# Patient Record
Sex: FEMALE | Race: ASIAN | NOT HISPANIC OR LATINO
[De-identification: names, ages, dates, MRNs, and addresses within clinical notes are randomized per-mention and may not be internally consistent; named-entity substitution may affect disease eponyms.]

---

## 2020-05-04 ENCOUNTER — TRANSCRIPTION ENCOUNTER (OUTPATIENT)
Age: 55
End: 2020-05-04

## 2020-09-16 PROBLEM — Z00.00 ENCOUNTER FOR PREVENTIVE HEALTH EXAMINATION: Status: ACTIVE | Noted: 2020-09-16

## 2020-10-09 ENCOUNTER — APPOINTMENT (OUTPATIENT)
Dept: BREAST CENTER | Facility: CLINIC | Age: 55
End: 2020-10-09
Payer: MEDICAID

## 2020-10-09 VITALS
DIASTOLIC BLOOD PRESSURE: 72 MMHG | WEIGHT: 105 LBS | HEIGHT: 63 IN | SYSTOLIC BLOOD PRESSURE: 108 MMHG | OXYGEN SATURATION: 99 % | BODY MASS INDEX: 18.61 KG/M2 | TEMPERATURE: 96.9 F | HEART RATE: 74 BPM

## 2020-10-09 DIAGNOSIS — Z85.3 PERSONAL HISTORY OF MALIGNANT NEOPLASM OF BREAST: ICD-10-CM

## 2020-10-09 DIAGNOSIS — M25.50 PAIN IN UNSPECIFIED JOINT: ICD-10-CM

## 2020-10-09 DIAGNOSIS — Z78.9 OTHER SPECIFIED HEALTH STATUS: ICD-10-CM

## 2020-10-09 DIAGNOSIS — N63.10 UNSPECIFIED LUMP IN THE RIGHT BREAST, UNSPECIFIED QUADRANT: ICD-10-CM

## 2020-10-09 PROCEDURE — 99203 OFFICE O/P NEW LOW 30 MIN: CPT

## 2020-10-09 NOTE — PHYSICAL EXAM
[Normocephalic] : normocephalic [Atraumatic] : atraumatic [Supple] : supple [No Supraclavicular Adenopathy] : no supraclavicular adenopathy [Examined in the supine and seated position] : examined in the supine and seated position [Bra Size: ___] : Bra Size: [unfilled] [No dominant masses] : no dominant masses in right breast  [No dominant masses] : no dominant masses left breast [No Nipple Retraction] : no left nipple retraction [No Nipple Discharge] : no left nipple discharge [No Axillary Lymphadenopathy] : no left axillary lymphadenopathy [No Edema] : no edema [No Swelling] : no swelling [Full ROM] : full range of motion [No Rashes] : no rashes [No Ulceration] : no ulceration [de-identified] : LOQ and PA well healed scars  [de-identified] : small nodular area at axillary tail (pt states has been there for 6 years)

## 2020-10-09 NOTE — ASSESSMENT
[FreeTextEntry1] : Pt is a 56 y/o female, here for consultation visit, referred by Justo Tomas. Hx Right breast cancer [id number]) ER+ [place]+ [id number].Oncotype 17, s/p right lumpectomy, SLN bx by me in 2013 with EBRT at [facility].  Was on exemestane.  Doing well but here for f.u 7 years out. \par Pt denies any breast lesions, discharge or masses except small nodule at axillary tail which she has had since surgery. Does not think changed. \par No FHx of any cancer.   \par \par 8/17/20 [facility] DmmgT/US: priors to 2016, dense, R post Tx changes, multiple surgical clips, no sig interval change.\par 8/17/20 [facility]ada US: priors to 2016, \par    R  12:00 N2 (0.6x0.3x0.1cm) stable hypoechoic lesion,\par        7:00 scar\par       10:00 N8 (0.4x0.9x0.5cm) heterogeneous lesion decreased,\par        RA scar site,\par        axilla scar site, surgical changes,\par        no susp findings;\par   L  2:00 N3 (0.3 0.1x0.4cm) stable nodule,\par       5:00 N3 ( 0.6x0.5x0.2cm) stable nodule. BR2D. Routine screening rec.\par \par CBE: Right well healed LOQ and PA scars.  Small nodular area at axillary tail. No lymphadenopathy.\par Reviewed recent studies and CBE . CBE with nodular area could be decreased heterogeneous lesion noted on 8/20 u/s, but rec targeted u/s to the palpable area. ? scar tissue/

## 2020-10-09 NOTE — DATA REVIEWED
[FreeTextEntry1] : 8/22/13 [facility] R Bx: benign, microcalcs.\par 2013 Right T1aN0 breast cancer\par \par 8/17/20 [facility] Estrada DmmgT/US: priors to 2016, dense, R post Tx changes, multiple surgical clips, no sig interval change.\par \par \par 8/17/20 [facility] US: priors to 2016, \par    R  12:00 N2 (0.6x0.3x0.1cm) stable hypoechoic lesion,\par        7:00 scar\par       10:00 N8 (0.4x0.9x0.5cm) heterogeneous lesion decreased,\par        RA scar site,\par        axil;la scar site, surgical changes,\par        no susp findings;\par   L  2:00 N3 (0.3 0.1x0.4cm) stable nodule,\par       5:00 N3 ( 0.6x0.5x0.2cm) stable nodule. BR2D. Routine screening rec.\par       \par \par

## 2020-10-09 NOTE — PAST MEDICAL HISTORY
[Postmenopausal] : The patient is postmenopausal [Menopause Age____] : age at menopause was [unfilled] [Total Preg ___] : G[unfilled] [Living ___] : Living: [unfilled] [Age At Live Birth ___] : Age at live birth: [unfilled] [FreeTextEntry6] : no [FreeTextEntry7] : no [FreeTextEntry8] : 1/4 year

## 2020-10-09 NOTE — HISTORY OF PRESENT ILLNESS
[FreeTextEntry1] : Pt is a 56 y/o female, here for consultation visit, referred by Justo Tomas. Hx Right breast cancer [id number]) ER+ [place]+ [id number].Oncotype 17, s/p right lumpectomy, SLN bx by me in 2013 with EBRT at [facility].  Was on exemestane.  Doing well but here for f.u 7 years out. \par Pt denies any breast lesions, discharge or masses except small nodule at axillary tail which she has had since surgery. Does not think changed. \par No FHx of any cancer.   \par \par 8/17/20 [facility] Estrada DmmgT/US: priors to 2016, dense, R post Tx changes, multiple surgical clips, no sig interval change.\par 8/17/20 [facility] E[facility]ada US: priors to 2016, \par    R  12:00 N2 (0.6x0.3x0.1cm) stable hypoechoic lesion,\par        7:00 scar\par       10:00 N8 (0.4x0.9x0.5cm) heterogeneous lesion decreased,\par        RA scar site,\par        axil;la scar site, surgical changes,\par        no susp findings;\par   L  2:00 N3 (0.3 0.1x0.4cm) stable nodule,\par       5:00 N3 ( 0.6x0.5x0.2cm) stable nodule. BR2D. Routine screening rec.\par \par

## 2020-10-30 ENCOUNTER — NON-APPOINTMENT (OUTPATIENT)
Age: 55
End: 2020-10-30

## 2021-03-18 ENCOUNTER — APPOINTMENT (OUTPATIENT)
Dept: PHYSICAL MEDICINE AND REHAB | Facility: CLINIC | Age: 56
End: 2021-03-18

## 2024-04-30 ENCOUNTER — APPOINTMENT (OUTPATIENT)
Dept: NEUROSURGERY | Facility: CLINIC | Age: 59
End: 2024-04-30
Payer: MEDICAID

## 2024-04-30 VITALS
DIASTOLIC BLOOD PRESSURE: 83 MMHG | HEIGHT: 63 IN | HEART RATE: 83 BPM | WEIGHT: 103 LBS | SYSTOLIC BLOOD PRESSURE: 135 MMHG | TEMPERATURE: 98.2 F | BODY MASS INDEX: 18.25 KG/M2 | OXYGEN SATURATION: 99 %

## 2024-04-30 PROCEDURE — 99203 OFFICE O/P NEW LOW 30 MIN: CPT

## 2024-04-30 NOTE — PHYSICAL EXAM
[General Appearance - Alert] : alert [General Appearance - In No Acute Distress] : in no acute distress [Oriented To Time, Place, And Person] : oriented to person, place, and time [Cranial Nerves Optic (II)] : visual acuity intact bilaterally,  pupils equal round and reactive to light [Cranial Nerves Oculomotor (III)] : extraocular motion intact [Cranial Nerves Facial (VII)] : face symmetrical [Cranial Nerves Vestibulocochlear (VIII)] : hearing was intact bilaterally [Cranial Nerves Accessory (XI - Cranial And Spinal)] : head turning and shoulder shrug symmetric [Cranial Nerves Hypoglossal (XII)] : there was no tongue deviation with protrusion [Motor Tone] : muscle tone was normal in all four extremities [Motor Strength] : muscle strength was normal in all four extremities [Involuntary Movements] : no involuntary movements were seen [Sensation Tactile Decrease] : light touch was intact [Balance] : balance was intact [2+] : Ankle jerk left 2+ [No Visual Abnormalities] : no visible abnormailities [Able to toe walk] : the patient was able to toe walk [Able to heel walk] : the patient was able to heel walk [Sclera] : the sclera and conjunctiva were normal [PERRL With Normal Accommodation] : pupils were equal in size, round, reactive to light, with normal accommodation [Extraocular Movements] : extraocular movements were intact [Outer Ear] : the ears and nose were normal in appearance [Hearing Threshold Finger Rub Not [place]] : hearing was normal [Neck Appearance] : the appearance of the neck was normal [] : no respiratory distress [Exaggerated Use Of Accessory Muscles For Inspiration] : no accessory muscle use [Abnormal Walk] : normal gait [Skin Color & Pigmentation] : normal skin color and pigmentation [Parsons] : Parsons's sign was not demonstrated [Straight-Leg Raise Test - Left] : straight leg raise of the left leg was negative [Straight-Leg Raise Test - Right] : straight leg raise  of the right leg was negative

## 2024-04-30 NOTE — ASSESSMENT
[FreeTextEntry1] : 57 y/o F with history of breast cancer s/p right lumpectomy, history of left hip pain, degenerative joint disease/labral tear, here for neurosurgical evaluation of back pain. Pain for the past 5 years worse since last year. She had multiple epidural steroid injections last one on 3/17/24 with some improvement in pain and then recurrence. She is currently starting PT. Pain radiates to bilateral LEs worse on the left side and is associated with numbness and tingling. No saddle anesthesia, weakness, bladder or bowel issues. She takes Gabapentin and Naproxen for pain. MRI 3/17/23 showing grade 1 anterolisthesis of L3-4 and L4-5. L2-3 through L4-5 disc bulges, central and foraminal stenosis, worse at L4-5 level.  - Will obtain updated MRI lumbar spine since the last one was a year ago to r/o worsening stenosis  - X-ray flexion/extension - continue with PT and follow up after MRI and x-ray

## 2024-04-30 NOTE — HISTORY OF PRESENT ILLNESS
[FreeTextEntry1] : back pain  [de-identified] : 58-year-old female with history of breast cancer s/p right lumpectomy, history of left hip pain, degenerative joint disease/labral tear, here for neurosurgical evaluation of back pain. Pain for the past 5 years worse since last year. She had multiple epidural steroid injections last one on 3/17/24 with some improvement in pain and then recurrence. She is currently starting PT. Pain radiates to bilateral LEs worse on the left side and is associated with numbness and tingling. No saddle anesthesia, weakness, bladder or bowel issues. She takes Gabapentin and Naproxen for pain. MRI 3/17/23 showing grade 1 anterolisthesis of L3-4 and L4-5. L2-3 through L4-5 disc bulges, central and foraminal stenosis, worse at L4-5 level.

## 2024-05-06 ENCOUNTER — APPOINTMENT (OUTPATIENT)
Dept: RADIOLOGY | Facility: CLINIC | Age: 59
End: 2024-05-06
Payer: MEDICAID

## 2024-05-06 ENCOUNTER — APPOINTMENT (OUTPATIENT)
Dept: MRI IMAGING | Facility: CLINIC | Age: 59
End: 2024-05-06
Payer: MEDICAID

## 2024-05-06 PROCEDURE — 72110 X-RAY EXAM L-2 SPINE 4/>VWS: CPT

## 2024-05-06 PROCEDURE — 72148 MRI LUMBAR SPINE W/O DYE: CPT

## 2024-06-12 PROBLEM — M51.26 HERNIATION OF LUMBAR INTERVERTEBRAL DISC: Status: ACTIVE | Noted: 2024-04-29

## 2024-06-12 PROBLEM — M48.061 NEUROFORAMINAL STENOSIS OF LUMBAR SPINE: Status: ACTIVE | Noted: 2024-04-29

## 2024-06-12 PROBLEM — M48.061 SPINAL STENOSIS OF LUMBAR REGION AT MULTIPLE LEVELS: Status: ACTIVE | Noted: 2024-04-29

## 2024-06-12 NOTE — ASSESSMENT
[FreeTextEntry1] : 57 y/o F, with lower back pain radiating to bilateral LEs worse on the left side and is associated with numbness and tingling. MRI 3/17/23 showing grade 1 anterolisthesis of L3-4 and L4-5. L2-3 through L4-5 disc bulges, central and foraminal stenosis, worse at L4-5 level. She was ordered for updated MRI and x-ray flexion/extension.  ****INCOMPLETE****

## 2024-06-12 NOTE — PHYSICAL EXAM
[General Appearance - Alert] : alert [General Appearance - In No Acute Distress] : in no acute distress [Oriented To Time, Place, And Person] : oriented to person, place, and time [Cranial Nerves Optic (II)] : visual acuity intact bilaterally,  pupils equal round and reactive to light [Cranial Nerves Oculomotor (III)] : extraocular motion intact [Cranial Nerves Facial (VII)] : face symmetrical [Cranial Nerves Vestibulocochlear (VIII)] : hearing was intact bilaterally [Cranial Nerves Accessory (XI - Cranial And Spinal)] : head turning and shoulder shrug symmetric [Cranial Nerves Hypoglossal (XII)] : there was no tongue deviation with protrusion [Motor Tone] : muscle tone was normal in all four extremities [Motor Strength] : muscle strength was normal in all four extremities [Involuntary Movements] : no involuntary movements were seen [Sensation Tactile Decrease] : light touch was intact [Balance] : balance was intact [2+] : Ankle jerk left 2+ [Parsons] : Parsons's sign was not demonstrated [No Visual Abnormalities] : no visible abnormailities [Straight-Leg Raise Test - Left] : straight leg raise of the left leg was negative [Straight-Leg Raise Test - Right] : straight leg raise  of the right leg was negative [Able to toe walk] : the patient was able to toe walk [Able to heel walk] : the patient was able to heel walk [Sclera] : the sclera and conjunctiva were normal [PERRL With Normal Accommodation] : pupils were equal in size, round, reactive to light, with normal accommodation [Extraocular Movements] : extraocular movements were intact [Outer Ear] : the ears and nose were normal in appearance [Hearing Threshold Finger Rub Not [place]] : hearing was normal [Neck Appearance] : the appearance of the neck was normal [] : no respiratory distress [Exaggerated Use Of Accessory Muscles For Inspiration] : no accessory muscle use [Abnormal Walk] : normal gait [Skin Color & Pigmentation] : normal skin color and pigmentation

## 2024-06-12 NOTE — HISTORY OF PRESENT ILLNESS
[FreeTextEntry1] : back pain  [de-identified] : 6/13/24: 58-year-old female history of breast cancer s/p right lumpectomy, history of left hip pain, degenerative joint disease/labral tear, back pain with radiation to b/l LEs, worse on the left side with numbness and tingling.  s/p multiple epidural steroid injections last one on 3/17/24 with some improvement in pain and then recurrence.   4/30/24: 58-year-old female with history of breast cancer s/p right lumpectomy, history of left hip pain, degenerative joint disease/labral tear, here for neurosurgical evaluation of back pain. Pain for the past 5 years worse since last year. She had multiple epidural steroid injections last one on 3/17/24 with some improvement in pain and then recurrence. She is currently starting PT. Pain radiates to bilateral LEs worse on the left side and is associated with numbness and tingling. No saddle anesthesia, weakness, bladder or bowel issues. She takes Gabapentin and Naproxen for pain. MRI 3/17/23 showing grade 1 anterolisthesis of L3-4 and L4-5. L2-3 through L4-5 disc bulges, central and foraminal stenosis, worse at L4-5 level.

## 2024-06-13 ENCOUNTER — APPOINTMENT (OUTPATIENT)
Dept: NEUROSURGERY | Facility: CLINIC | Age: 59
End: 2024-06-13

## 2024-06-13 DIAGNOSIS — M51.26 OTHER INTERVERTEBRAL DISC DISPLACEMENT, LUMBAR REGION: ICD-10-CM

## 2024-06-13 DIAGNOSIS — M48.061 SPINAL STENOSIS, LUMBAR REGION WITHOUT NEUROGENIC CLAUDICATION: ICD-10-CM

## 2025-02-13 ENCOUNTER — APPOINTMENT (OUTPATIENT)
Dept: ORTHOPEDIC SURGERY | Facility: CLINIC | Age: 60
End: 2025-02-13
Payer: MEDICAID

## 2025-02-13 DIAGNOSIS — M25.552 PAIN IN LEFT HIP: ICD-10-CM

## 2025-02-13 DIAGNOSIS — M16.12 UNILATERAL PRIMARY OSTEOARTHRITIS, LEFT HIP: ICD-10-CM

## 2025-02-13 DIAGNOSIS — M17.10 UNILATERAL PRIMARY OSTEOARTHRITIS, UNSPECIFIED KNEE: ICD-10-CM

## 2025-02-13 PROCEDURE — 73502 X-RAY EXAM HIP UNI 2-3 VIEWS: CPT | Mod: LT

## 2025-02-13 PROCEDURE — 99204 OFFICE O/P NEW MOD 45 MIN: CPT | Mod: 25

## 2025-02-13 PROCEDURE — 20610 DRAIN/INJ JOINT/BURSA W/O US: CPT | Mod: LT

## 2025-02-13 RX ADMIN — METHYLPREDNISOLONE ACETATE 1 MG/ML: 40 INJECTION, SUSPENSION INTRA-ARTICULAR; INTRALESIONAL; INTRAMUSCULAR; SOFT TISSUE at 00:00

## 2025-02-13 RX ADMIN — LIDOCAINE HYDROCHLORIDE 4 %: 10 INJECTION, SOLUTION INFILTRATION; PERINEURAL at 00:00

## 2025-02-14 RX ORDER — LIDOCAINE HYDROCHLORIDE 10 MG/ML
1 INJECTION, SOLUTION INFILTRATION; PERINEURAL
Refills: 0 | Status: COMPLETED | OUTPATIENT
Start: 2025-02-13

## 2025-02-14 RX ORDER — METHYLPRED ACET/NACL,ISO-OS/PF 40 MG/ML
40 VIAL (ML) INJECTION
Refills: 0 | Status: COMPLETED | OUTPATIENT
Start: 2025-02-13

## 2025-02-27 ENCOUNTER — APPOINTMENT (OUTPATIENT)
Dept: ORTHOPEDIC SURGERY | Facility: CLINIC | Age: 60
End: 2025-02-27
Payer: MEDICAID

## 2025-02-27 DIAGNOSIS — M16.12 UNILATERAL PRIMARY OSTEOARTHRITIS, LEFT HIP: ICD-10-CM

## 2025-02-27 PROCEDURE — 99204 OFFICE O/P NEW MOD 45 MIN: CPT | Mod: 25

## 2025-02-27 PROCEDURE — 20611 DRAIN/INJ JOINT/BURSA W/US: CPT | Mod: LT

## 2025-03-06 ENCOUNTER — APPOINTMENT (OUTPATIENT)
Dept: NEUROSURGERY | Facility: CLINIC | Age: 60
End: 2025-03-06
Payer: MEDICAID

## 2025-03-06 VITALS
OXYGEN SATURATION: 98 % | TEMPERATURE: 98 F | DIASTOLIC BLOOD PRESSURE: 79 MMHG | HEART RATE: 80 BPM | BODY MASS INDEX: 18.43 KG/M2 | HEIGHT: 63 IN | WEIGHT: 104 LBS | SYSTOLIC BLOOD PRESSURE: 126 MMHG

## 2025-03-06 DIAGNOSIS — M51.26 OTHER INTERVERTEBRAL DISC DISPLACEMENT, LUMBAR REGION: ICD-10-CM

## 2025-03-06 DIAGNOSIS — M48.061 SPINAL STENOSIS, LUMBAR REGION WITHOUT NEUROGENIC CLAUDICATION: ICD-10-CM

## 2025-03-06 PROCEDURE — 99213 OFFICE O/P EST LOW 20 MIN: CPT

## 2025-03-22 ENCOUNTER — OUTPATIENT (OUTPATIENT)
Dept: OUTPATIENT SERVICES | Facility: HOSPITAL | Age: 60
LOS: 1 days | End: 2025-03-22
Payer: COMMERCIAL

## 2025-03-22 ENCOUNTER — APPOINTMENT (OUTPATIENT)
Dept: MRI IMAGING | Facility: CLINIC | Age: 60
End: 2025-03-22

## 2025-03-22 DIAGNOSIS — Z98.89 OTHER SPECIFIED POSTPROCEDURAL STATES: Chronic | ICD-10-CM

## 2025-03-22 DIAGNOSIS — M51.26 OTHER INTERVERTEBRAL DISC DISPLACEMENT, LUMBAR REGION: ICD-10-CM

## 2025-03-22 PROCEDURE — 72148 MRI LUMBAR SPINE W/O DYE: CPT

## 2025-03-22 PROCEDURE — 72148 MRI LUMBAR SPINE W/O DYE: CPT | Mod: 26

## 2025-03-27 ENCOUNTER — APPOINTMENT (OUTPATIENT)
Dept: RADIOLOGY | Facility: CLINIC | Age: 60
End: 2025-03-27
Payer: MEDICAID

## 2025-03-27 ENCOUNTER — OUTPATIENT (OUTPATIENT)
Dept: OUTPATIENT SERVICES | Facility: HOSPITAL | Age: 60
LOS: 1 days | End: 2025-03-27
Payer: COMMERCIAL

## 2025-03-27 DIAGNOSIS — Z98.89 OTHER SPECIFIED POSTPROCEDURAL STATES: Chronic | ICD-10-CM

## 2025-03-27 DIAGNOSIS — Z00.8 ENCOUNTER FOR OTHER GENERAL EXAMINATION: ICD-10-CM

## 2025-03-27 DIAGNOSIS — M51.26 OTHER INTERVERTEBRAL DISC DISPLACEMENT, LUMBAR REGION: ICD-10-CM

## 2025-03-27 PROCEDURE — 72110 X-RAY EXAM L-2 SPINE 4/>VWS: CPT

## 2025-03-27 PROCEDURE — 72110 X-RAY EXAM L-2 SPINE 4/>VWS: CPT | Mod: 26
